# Patient Record
Sex: FEMALE | Race: WHITE | Employment: PART TIME | URBAN - METROPOLITAN AREA
[De-identification: names, ages, dates, MRNs, and addresses within clinical notes are randomized per-mention and may not be internally consistent; named-entity substitution may affect disease eponyms.]

---
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
## 2020-11-17 ENCOUNTER — HOSPITAL ENCOUNTER (OUTPATIENT)
Dept: ULTRASOUND IMAGING | Age: 20
Discharge: HOME | End: 2020-11-17
Payer: COMMERCIAL

## 2020-11-17 PROCEDURE — 76642 ULTRASOUND BREAST LIMITED: CPT

## 2021-05-08 ENCOUNTER — HOSPITAL ENCOUNTER (EMERGENCY)
Age: 21
Discharge: HOME | End: 2021-05-08
Attending: EMERGENCY MEDICINE
Payer: COMMERCIAL

## 2021-05-08 VITALS
SYSTOLIC BLOOD PRESSURE: 125 MMHG | WEIGHT: 212 LBS | TEMPERATURE: 97.7 F | RESPIRATION RATE: 16 BRPM | HEART RATE: 61 BPM | OXYGEN SATURATION: 97 % | BODY MASS INDEX: 36.19 KG/M2 | DIASTOLIC BLOOD PRESSURE: 69 MMHG | HEIGHT: 64 IN

## 2021-05-08 DIAGNOSIS — J06.9 UPPER RESPIRATORY TRACT INFECTION, UNSPECIFIED TYPE: Primary | ICD-10-CM

## 2021-05-08 LAB
BILIRUBIN URINE: NEGATIVE
BLOOD, URINE: NEGATIVE
CLARITY: CLEAR
COLOR: YELLOW
GLUCOSE URINE: NEGATIVE MG/DL
HCG(URINE) PREGNANCY TEST: NEGATIVE
KETONES, URINE: NEGATIVE MG/DL
LEUKOCYTE ESTERASE, URINE: NEGATIVE
NITRITE, URINE: NEGATIVE
PH UA: >=9 (ref 5–9)
PROTEIN UA: NEGATIVE MG/DL
SPECIFIC GRAVITY UA: 1.02 (ref 1–1.03)
STREP GRP A PCR: NEGATIVE
UROBILINOGEN, URINE: 0.2 E.U./DL

## 2021-05-08 PROCEDURE — 81025 URINE PREGNANCY TEST: CPT

## 2021-05-08 PROCEDURE — 99282 EMERGENCY DEPT VISIT SF MDM: CPT

## 2021-05-08 PROCEDURE — 87880 STREP A ASSAY W/OPTIC: CPT

## 2021-05-08 PROCEDURE — 81003 URINALYSIS AUTO W/O SCOPE: CPT

## 2021-05-08 RX ORDER — DOXYCYCLINE HYCLATE 100 MG
100 TABLET ORAL 2 TIMES DAILY
Qty: 20 TABLET | Refills: 0 | Status: SHIPPED | OUTPATIENT
Start: 2021-05-08 | End: 2021-05-18

## 2021-05-08 RX ORDER — BROMPHENIRAMINE MALEATE, PSEUDOEPHEDRINE HYDROCHLORIDE, AND DEXTROMETHORPHAN HYDROBROMIDE 2; 30; 10 MG/5ML; MG/5ML; MG/5ML
5 SYRUP ORAL 4 TIMES DAILY PRN
Qty: 120 ML | Refills: 0 | Status: SHIPPED | OUTPATIENT
Start: 2021-05-08 | End: 2021-06-06

## 2021-05-08 ASSESSMENT — ENCOUNTER SYMPTOMS
ABDOMINAL DISTENTION: 0
COUGH: 0
EYE DISCHARGE: 0
VOMITING: 0
EYE PAIN: 0
DIARRHEA: 0
NAUSEA: 0
EYE REDNESS: 0
BACK PAIN: 0
SINUS PRESSURE: 0
SORE THROAT: 1
SHORTNESS OF BREATH: 0
WHEEZING: 0

## 2021-05-08 NOTE — LETTER
[address] [place] [address] Emergency Department  [facility] [address] [place] [address]  Phone: [phone]               May 8, 2021    Patient: Mandy Lopez   YOB: 2000   Date of Visit: 5/8/2021       To Whom It May Concern: Nasreen Wright was seen and treated in our emergency department on 5/8/2021. She may return to work on 5/9/21.       Sincerely,       Alda Sandoval RN         Signature:__________________________________

## 2021-05-08 NOTE — ED PROVIDER NOTES
Patient had Covid test last week which was negative    The history is provided by the patient. Illness   The current episode started more than 2 weeks ago. The onset was gradual. Associated symptoms include congestion, sore throat and muscle aches. Pertinent negatives include no fever, no diarrhea, no nausea, no vomiting, no ear pain, no headaches, no cough, no wheezing, no rash, no eye discharge, no eye pain and no eye redness. Review of Systems   Constitutional: Negative for chills and fever. HENT: Positive for congestion and sore throat. Negative for ear pain and sinus pressure. Eyes: Negative for pain, discharge and redness. Respiratory: Negative for cough, shortness of breath and wheezing. Cardiovascular: Negative for chest pain. Gastrointestinal: Negative for abdominal distention, diarrhea, nausea and vomiting. Genitourinary: Negative for dysuria and frequency. Musculoskeletal: Negative for arthralgias and back pain. Skin: Negative for rash and wound. Neurological: Negative for weakness and headaches. Hematological: Negative for adenopathy. All other systems reviewed and are negative. Physical Exam  Vitals signs and nursing note reviewed. Constitutional:       Appearance: She is well-developed. HENT:      Head: Normocephalic and atraumatic. Right Ear: Hearing, tympanic membrane and external ear normal.      Left Ear: Hearing, tympanic membrane and external ear normal.      Nose: Mucosal edema and congestion present. Mouth/Throat:      Pharynx: Uvula midline. Eyes:      General: Lids are normal.      Conjunctiva/sclera: Conjunctivae normal.      Pupils: Pupils are equal, round, and reactive to light. Neck:      Musculoskeletal: Normal range of motion and neck supple. Cardiovascular:      Rate and Rhythm: Normal rate and regular rhythm. Heart sounds: Normal heart sounds. No murmur.    Pulmonary:      Effort: Pulmonary effort is normal. No respiratory distress. Breath sounds: Normal breath sounds. No wheezing or rales. Abdominal:      General: Bowel sounds are normal.      Palpations: Abdomen is soft. Abdomen is not rigid. Tenderness: There is no abdominal tenderness. There is no guarding or rebound. Skin:     General: Skin is warm and dry. Findings: No abrasion or rash. Neurological:      Mental Status: She is alert and oriented to person, place, and time. GCS: GCS eye subscore is 4. GCS verbal subscore is 5. GCS motor subscore is 6. Cranial Nerves: No cranial nerve deficit. Sensory: No sensory deficit. Coordination: Coordination normal.      Gait: Gait normal.          Procedures     MDM          --------------------------------------------- PAST HISTORY ---------------------------------------------  Past Medical History:  has a past medical history of ADHD, Bipolar 1 disorder ([facility] [address] Depression, and Insomnia. Past Surgical History:  has no past surgical history on file. Social History:  reports that she has been smoking cigarettes. She has been smoking about 0.50 packs per day. She has never used smokeless tobacco. She reports current drug use. Drug: Marijuana. She reports that she does not drink alcohol. Family History: family history is not on file. The patients home medications have been reviewed.     Allergies: Sulfa antibiotics and Amoxicillin    -------------------------------------------------- RESULTS -------------------------------------------------  Labs:  Results for orders placed or performed during the hospital encounter of 05/08/21   Strep screen group a throat    Specimen: Throat   Result Value Ref Range    Strep Grp A PCR Negative Negative   Urinalysis   Result Value Ref Range    Color, UA Yellow Straw/Yellow    Clarity, UA Clear Clear    Glucose, Ur Negative Negative mg/dL    Bilirubin Urine Negative Negative    Ketones, Urine Negative Negative mg/dL    Specific Gravity, UA 1.020 1.005 - 1.030    Blood, Urine Negative Negative    pH, UA >=9.0 5.0 - 9.0    Protein, UA Negative Negative mg/dL    Urobilinogen, Urine 0.2 <2.0 E.U./dL    Nitrite, Urine Negative Negative    Leukocyte Esterase, Urine Negative Negative   Pregnancy, Urine   Result Value Ref Range    HCG(Urine) Pregnancy Test NEGATIVE NEGATIVE       Radiology:  No orders to display       ------------------------- NURSING NOTES AND VITALS REVIEWED ---------------------------  Date / Time Roomed:  5/8/2021 12:47 PM  ED Bed Assignment:  02/02    The nursing notes within the ED encounter and vital signs as below have been reviewed. /69   Pulse 61   Temp 97.7 °F (36.5 °C) (Temporal)   Resp 16   Ht 5' 4\" (1.626 m)   Wt 212 lb (96.2 kg)   SpO2 97%   BMI 36.39 kg/m²   Oxygen Saturation Interpretation: Normal      ------------------------------------------ PROGRESS NOTES ------------------------------------------  I have spoken with the patient and discussed todays results, in addition to providing specific details for the plan of care and counseling regarding the diagnosis and prognosis. Their questions are answered at this time and they are agreeable with the plan. I discussed at length with them reasons for immediate return here for re evaluation. They will followup with primary care by calling their office tomorrow. --------------------------------- ADDITIONAL PROVIDER NOTES ---------------------------------  At this time the patient is without objective evidence of an acute process requiring hospitalization or inpatient management. They have remained hemodynamically stable throughout their entire ED visit and are stable for discharge with outpatient follow-up. The plan has been discussed in detail and they are aware of the specific conditions for emergent return, as well as the importance of follow-up.       New Prescriptions    BROMPHENIRAMINE-PSEUDOEPHEDRINE-DM 2-30-10 MG/5ML SYRUP    Take 5 mLs by mouth 4

## 2021-06-06 ENCOUNTER — HOSPITAL ENCOUNTER (EMERGENCY)
Age: 21
Discharge: HOME | End: 2021-06-06
Attending: EMERGENCY MEDICINE
Payer: COMMERCIAL

## 2021-06-06 VITALS
OXYGEN SATURATION: 96 % | HEART RATE: 85 BPM | SYSTOLIC BLOOD PRESSURE: 127 MMHG | WEIGHT: 212 LBS | DIASTOLIC BLOOD PRESSURE: 81 MMHG | BODY MASS INDEX: 36.39 KG/M2 | TEMPERATURE: 97.7 F | RESPIRATION RATE: 16 BRPM

## 2021-06-06 DIAGNOSIS — H61.23 BILATERAL IMPACTED CERUMEN: Primary | ICD-10-CM

## 2021-06-06 PROCEDURE — 99283 EMERGENCY DEPT VISIT LOW MDM: CPT

## 2021-06-06 PROCEDURE — 69209 REMOVE IMPACTED EAR WAX UNI: CPT

## 2021-06-06 ASSESSMENT — PAIN DESCRIPTION - ORIENTATION: ORIENTATION: LEFT

## 2021-06-06 ASSESSMENT — PAIN DESCRIPTION - FREQUENCY: FREQUENCY: INTERMITTENT

## 2021-06-06 ASSESSMENT — PAIN DESCRIPTION - PAIN TYPE: TYPE: ACUTE PAIN

## 2021-06-06 ASSESSMENT — PAIN DESCRIPTION - LOCATION: LOCATION: EAR

## 2021-06-06 ASSESSMENT — PAIN SCALES - GENERAL: PAINLEVEL_OUTOF10: 5

## 2021-06-06 ASSESSMENT — PAIN DESCRIPTION - DESCRIPTORS: DESCRIPTORS: SHARP

## 2021-06-06 NOTE — ED PROVIDER NOTES
HPI:  6/6/21,   Time: 4:23 PM EDT         Babs Hoffman is a 21 y.o. female presenting to the ED for sudden onset of left ear pain and decreased hearing, beginning 1 day ago. The complaint has been persistent, moderate in severity, and worsened by nothing. ROS:   Pertinent positives and negatives are stated within HPI, all other systems reviewed and are negative.  --------------------------------------------- PAST HISTORY ---------------------------------------------  Past Medical History:  has a past medical history of ADHD, Bipolar 1 disorder ([facility] [address] Depression, and Insomnia. Past Surgical History:  has no past surgical history on file. Social History:  reports that she has been smoking cigarettes. She has been smoking about 0.50 packs per day. She has never used smokeless tobacco. She reports current drug use. Drug: Marijuana. She reports that she does not drink alcohol. Family History: family history is not on file. The patients home medications have been reviewed. Allergies: Sulfa antibiotics and Amoxicillin    -------------------------------------------------- RESULTS -------------------------------------------------  All laboratory and radiology results have been personally reviewed by myself   LABS:  No results found for this visit on 06/06/21. RADIOLOGY:  Interpreted by Radiologist.  No orders to display       ------------------------- NURSING NOTES AND VITALS REVIEWED ---------------------------   The nursing notes within the ED encounter and vital signs as below have been reviewed.    /81   Pulse 85   Temp 97.7 °F (36.5 °C) (Temporal)   Resp 16   Wt 212 lb (96.2 kg)   SpO2 96%   BMI 36.39 kg/m²   Oxygen Saturation Interpretation: Normal      ---------------------------------------------------PHYSICAL EXAM--------------------------------------      Constitutional/General: Alert and oriented x3, well appearing, non toxic in NAD  Head: NC/AT  Eyes: PERRL, EOMI  Ears:

## 2021-08-13 ENCOUNTER — HOSPITAL ENCOUNTER (EMERGENCY)
Age: 21
Discharge: HOME | End: 2021-08-13
Attending: EMERGENCY MEDICINE
Payer: COMMERCIAL

## 2021-08-13 VITALS
WEIGHT: 212 LBS | TEMPERATURE: 97.5 F | RESPIRATION RATE: 16 BRPM | HEART RATE: 80 BPM | BODY MASS INDEX: 36.39 KG/M2 | DIASTOLIC BLOOD PRESSURE: 70 MMHG | OXYGEN SATURATION: 97 % | SYSTOLIC BLOOD PRESSURE: 124 MMHG

## 2021-08-13 DIAGNOSIS — J01.90 ACUTE SINUSITIS, RECURRENCE NOT SPECIFIED, UNSPECIFIED LOCATION: Primary | ICD-10-CM

## 2021-08-13 PROCEDURE — 99283 EMERGENCY DEPT VISIT LOW MDM: CPT

## 2021-08-13 RX ORDER — BROMPHENIRAMINE MALEATE, PSEUDOEPHEDRINE HYDROCHLORIDE, AND DEXTROMETHORPHAN HYDROBROMIDE 2; 30; 10 MG/5ML; MG/5ML; MG/5ML
5 SYRUP ORAL 4 TIMES DAILY PRN
Qty: 120 ML | Refills: 0 | Status: SHIPPED | OUTPATIENT
Start: 2021-08-13 | End: 2021-09-17 | Stop reason: ALTCHOICE

## 2021-08-13 RX ORDER — DOXYCYCLINE HYCLATE 100 MG
100 TABLET ORAL 2 TIMES DAILY
Qty: 20 TABLET | Refills: 0 | Status: SHIPPED | OUTPATIENT
Start: 2021-08-13 | End: 2021-08-23

## 2021-08-13 ASSESSMENT — ENCOUNTER SYMPTOMS
SHORTNESS OF BREATH: 0
EYE PAIN: 0
BACK PAIN: 0
COUGH: 1
SINUS PRESSURE: 0
EYE DISCHARGE: 0
WHEEZING: 0
RHINORRHEA: 1
EYE REDNESS: 0
DIARRHEA: 0
NAUSEA: 0
SORE THROAT: 0
ABDOMINAL DISTENTION: 0
VOMITING: 0

## 2021-08-13 ASSESSMENT — PAIN DESCRIPTION - LOCATION: LOCATION: HEAD

## 2021-08-13 ASSESSMENT — PAIN DESCRIPTION - PAIN TYPE: TYPE: ACUTE PAIN

## 2021-08-13 ASSESSMENT — PAIN DESCRIPTION - DESCRIPTORS: DESCRIPTORS: ACHING

## 2021-08-13 ASSESSMENT — PAIN DESCRIPTION - PROGRESSION
CLINICAL_PROGRESSION: NOT CHANGED
CLINICAL_PROGRESSION: NOT CHANGED

## 2021-08-13 ASSESSMENT — PAIN DESCRIPTION - FREQUENCY: FREQUENCY: CONTINUOUS

## 2021-08-13 ASSESSMENT — PAIN SCALES - GENERAL: PAINLEVEL_OUTOF10: 5

## 2021-08-13 NOTE — ED PROVIDER NOTES
The history is provided by the patient. URI  Presenting symptoms: congestion, cough, ear pain, facial pain and rhinorrhea    Presenting symptoms: no fatigue, no fever and no sore throat    Severity:  Moderate  Onset quality:  Gradual  Duration:  1 week  Chronicity:  New  Associated symptoms: no arthralgias, no headaches and no wheezing         Review of Systems   Constitutional: Negative for chills, fatigue and fever. HENT: Positive for congestion, ear pain and rhinorrhea. Negative for sinus pressure and sore throat. Eyes: Negative for pain, discharge and redness. Respiratory: Positive for cough. Negative for shortness of breath and wheezing. Cardiovascular: Negative for chest pain. Gastrointestinal: Negative for abdominal distention, diarrhea, nausea and vomiting. Genitourinary: Negative for dysuria and frequency. Musculoskeletal: Negative for arthralgias and back pain. Skin: Negative for rash and wound. Neurological: Negative for weakness and headaches. Hematological: Negative for adenopathy. All other systems reviewed and are negative. Physical Exam  Vitals and nursing note reviewed. Constitutional:       Appearance: She is well-developed. HENT:      Head: Normocephalic and atraumatic. Right Ear: Hearing and external ear normal. Tympanic membrane is retracted. Left Ear: Hearing and external ear normal. Tympanic membrane is retracted. Nose: Mucosal edema, congestion and rhinorrhea present. Mouth/Throat:      Pharynx: Uvula midline. Eyes:      General: Lids are normal.      Conjunctiva/sclera: Conjunctivae normal.      Pupils: Pupils are equal, round, and reactive to light. Cardiovascular:      Rate and Rhythm: Normal rate and regular rhythm. Heart sounds: Normal heart sounds. No murmur heard. Pulmonary:      Effort: Pulmonary effort is normal. No respiratory distress. Breath sounds: Normal breath sounds. No wheezing or rales.    Abdominal: General: Bowel sounds are normal.      Palpations: Abdomen is soft. Abdomen is not rigid. Tenderness: There is no abdominal tenderness. There is no guarding or rebound. Musculoskeletal:      Cervical back: Normal range of motion and neck supple. Skin:     General: Skin is warm and dry. Findings: No abrasion or rash. Neurological:      Mental Status: She is alert and oriented to person, place, and time. GCS: GCS eye subscore is 4. GCS verbal subscore is 5. GCS motor subscore is 6. Cranial Nerves: No cranial nerve deficit. Sensory: No sensory deficit. Coordination: Coordination normal.      Gait: Gait normal.          Procedures     MDM          --------------------------------------------- PAST HISTORY ---------------------------------------------  Past Medical History:  has a past medical history of ADHD, Bipolar 1 disorder ([facility] [address] Depression, and Insomnia. Past Surgical History:  has no past surgical history on file. Social History:  reports that she has been smoking cigarettes. She has been smoking about 0.50 packs per day. She has never used smokeless tobacco. She reports current drug use. Drug: Marijuana. She reports that she does not drink alcohol. Family History: family history is not on file. The patients home medications have been reviewed. Allergies: Sulfa antibiotics and Amoxicillin    -------------------------------------------------- RESULTS -------------------------------------------------  Labs:  No results found for this visit on 08/13/21. Radiology:  No orders to display       ------------------------- NURSING NOTES AND VITALS REVIEWED ---------------------------  Date / Time Roomed:  8/13/2021  9:16 AM  ED Bed Assignment:  01/01    The nursing notes within the ED encounter and vital signs as below have been reviewed.    /70   Pulse 80   Temp 97.5 °F (36.4 °C) (Temporal)   Resp 16   Wt 212 lb (96.2 kg)   SpO2 97%   BMI 36.39 kg/m² Oxygen Saturation Interpretation: Normal      ------------------------------------------ PROGRESS NOTES ------------------------------------------  I have spoken with the patient and discussed todays results, in addition to providing specific details for the plan of care and counseling regarding the diagnosis and prognosis. Their questions are answered at this time and they are agreeable with the plan. I discussed at length with them reasons for immediate return here for re evaluation. They will followup with primary care by calling their office tomorrow. --------------------------------- ADDITIONAL PROVIDER NOTES ---------------------------------  At this time the patient is without objective evidence of an acute process requiring hospitalization or inpatient management. They have remained hemodynamically stable throughout their entire ED visit and are stable for discharge with outpatient follow-up. The plan has been discussed in detail and they are aware of the specific conditions for emergent return, as well as the importance of follow-up. New Prescriptions    BROMPHENIRAMINE-PSEUDOEPHEDRINE-DM 2-30-10 MG/5ML SYRUP    Take 5 mLs by mouth 4 times daily as needed for Congestion or Cough    DOXYCYCLINE HYCLATE (VIBRA-TABS) 100 MG TABLET    Take 1 tablet by mouth 2 times daily for 10 days       Diagnosis:  1. Acute sinusitis, recurrence not specified, unspecified location        Disposition:  Patient's disposition: Discharge to home  Patient's condition is stable.                      Marissa Guillory MD  08/13/21 [id number]

## 2021-08-13 NOTE — LETTER
[address] [place] [address] [facility] Department  [facility] [address] [place] [address]  Phone: [phone]               August 13, 2021    Patient: Charan Sandoval   YOB: 2000   Date of Visit: 8/13/2021       To Whom It May Concern: Svetlana Florian was seen and treated in our emergency department on 8/13/2021. She may return to work on 8/14/2021.       Sincerely,       Daniel Fraire RN         Signature:__________________________________

## 2021-09-17 ENCOUNTER — HOSPITAL ENCOUNTER (EMERGENCY)
Age: 21
Discharge: HOME | End: 2021-09-17
Attending: EMERGENCY MEDICINE
Payer: COMMERCIAL

## 2021-09-17 VITALS
DIASTOLIC BLOOD PRESSURE: 86 MMHG | HEART RATE: 93 BPM | RESPIRATION RATE: 13 BRPM | TEMPERATURE: 97.7 F | SYSTOLIC BLOOD PRESSURE: 138 MMHG | OXYGEN SATURATION: 98 % | BODY MASS INDEX: 39.99 KG/M2 | WEIGHT: 233 LBS

## 2021-09-17 DIAGNOSIS — J06.9 VIRAL URI: Primary | ICD-10-CM

## 2021-09-17 LAB — SARS-COV-2, NAAT: NOT DETECTED

## 2021-09-17 PROCEDURE — 87635 SARS-COV-2 COVID-19 AMP PRB: CPT

## 2021-09-17 PROCEDURE — 99283 EMERGENCY DEPT VISIT LOW MDM: CPT

## 2021-09-17 RX ORDER — BROMPHENIRAMINE MALEATE, PSEUDOEPHEDRINE HYDROCHLORIDE, AND DEXTROMETHORPHAN HYDROBROMIDE 2; 30; 10 MG/5ML; MG/5ML; MG/5ML
5 SYRUP ORAL 4 TIMES DAILY PRN
Qty: 120 ML | Refills: 0 | Status: SHIPPED | OUTPATIENT
Start: 2021-09-17 | End: 2021-12-29 | Stop reason: ALTCHOICE

## 2021-09-17 ASSESSMENT — ENCOUNTER SYMPTOMS
NAUSEA: 0
BACK PAIN: 0
RHINORRHEA: 1
EYE REDNESS: 0
SORE THROAT: 0
SINUS PRESSURE: 0
VOMITING: 0
ABDOMINAL DISTENTION: 0
EYE DISCHARGE: 0
DIARRHEA: 0
SHORTNESS OF BREATH: 0
EYE PAIN: 0
COUGH: 1
WHEEZING: 0

## 2021-09-17 NOTE — ED PROVIDER NOTES
Vaccinated for Covid    Denies loss of smell or taste    The history is provided by the patient. URI  Presenting symptoms: congestion, cough and rhinorrhea    Presenting symptoms: no ear pain, no fever and no sore throat    Severity:  Mild  Onset quality:  Gradual  Duration:  1 day  Chronicity:  New  Associated symptoms: no arthralgias, no headaches and no wheezing         Review of Systems   Constitutional: Negative for chills and fever. HENT: Positive for congestion and rhinorrhea. Negative for ear pain, sinus pressure and sore throat. Eyes: Negative for pain, discharge and redness. Respiratory: Positive for cough. Negative for shortness of breath and wheezing. Cardiovascular: Negative for chest pain. Gastrointestinal: Negative for abdominal distention, diarrhea, nausea and vomiting. Genitourinary: Negative for dysuria and frequency. Musculoskeletal: Negative for arthralgias and back pain. Skin: Negative for rash and wound. Neurological: Negative for weakness and headaches. Hematological: Negative for adenopathy. All other systems reviewed and are negative. Physical Exam  Vitals and nursing note reviewed. Constitutional:       Appearance: She is well-developed. HENT:      Head: Normocephalic and atraumatic. Right Ear: Tympanic membrane is retracted. Left Ear: Tympanic membrane is retracted. Nose: Mucosal edema and congestion present. Eyes:      Pupils: Pupils are equal, round, and reactive to light. Cardiovascular:      Rate and Rhythm: Normal rate and regular rhythm. Heart sounds: Normal heart sounds. No murmur heard. Pulmonary:      Effort: Pulmonary effort is normal. No respiratory distress. Breath sounds: Normal breath sounds. No wheezing or rales. Abdominal:      General: Bowel sounds are normal.      Palpations: Abdomen is soft. Tenderness: There is no abdominal tenderness. There is no guarding or rebound.    Musculoskeletal: Cervical back: Normal range of motion and neck supple. Skin:     General: Skin is warm and dry. Neurological:      Mental Status: She is alert and oriented to person, place, and time. Cranial Nerves: No cranial nerve deficit. Coordination: Coordination normal.          Procedures     [facility]          --------------------------------------------- PAST HISTORY ---------------------------------------------  Past Medical History:  has a past medical history of ADHD, Bipolar 1 disorder ([address] Depression, and Insomnia. Past Surgical History:  has no past surgical history on file. Social History:  reports that she has been smoking cigarettes. She has been smoking about 0.50 packs per day. She has never used smokeless tobacco. She reports current drug use. Drug: Marijuana. She reports that she does not drink alcohol. Family History: family history is not on file. The patients home medications have been reviewed. Allergies: Sulfa antibiotics and Amoxicillin    -------------------------------------------------- RESULTS -------------------------------------------------  Labs:  Results for orders placed or performed during the hospital encounter of 09/17/21   COVID-19, Rapid    Specimen: Nasopharyngeal Swab   Result Value Ref Range    SARS-CoV-2, NAAT Not Detected Not Detected       Radiology:  No orders to display       ------------------------- NURSING NOTES AND VITALS REVIEWED ---------------------------  Date / Time Roomed:  9/17/2021  9:07 AM  ED Bed Assignment:  02/02    The nursing notes within the ED encounter and vital signs as below have been reviewed.    /86   Pulse 93   Temp 97.7 °F (36.5 °C) (Temporal)   Resp 13   Wt 233 lb (105.7 kg)   SpO2 98%   BMI 39.99 kg/m²   Oxygen Saturation Interpretation: Normal      ------------------------------------------ PROGRESS NOTES ------------------------------------------  I have spoken with the patient and discussed todays results, in addition to providing specific details for the plan of care and counseling regarding the diagnosis and prognosis. Their questions are answered at this time and they are agreeable with the plan. I discussed at length with them reasons for immediate return here for re evaluation. They will followup with primary care by calling their office tomorrow. --------------------------------- ADDITIONAL PROVIDER NOTES ---------------------------------  At this time the patient is without objective evidence of an acute process requiring hospitalization or inpatient management. They have remained hemodynamically stable throughout their entire ED visit and are stable for discharge with outpatient follow-up. The plan has been discussed in detail and they are aware of the specific conditions for emergent return, as well as the importance of follow-up. New Prescriptions    BROMPHENIRAMINE-PSEUDOEPHEDRINE-DM 2-30-10 MG/5ML SYRUP    Take 5 mLs by mouth 4 times daily as needed for Congestion or Cough       Diagnosis:  1. Viral URI        Disposition:  Patient's disposition: Discharge to home  Patient's condition is stable.                    Tash Edwards MD  09/17/21 1007

## 2021-09-17 NOTE — LETTER
[address] [place] [address] Emergency Department  [address] [address]  Phone: [phone]               September 17, 2021    Patient: Daniel Stephenson   YOB: 2000   Date of Visit: 9/17/2021       To Whom It May Concern: Florencia Fox was seen and treated in our emergency department on 9/17/2021. She may return to work on 9/18/2021.       Sincerely,       Jolanda Kanner, RN         Signature:__________________________________

## 2021-12-29 ENCOUNTER — HOSPITAL ENCOUNTER (EMERGENCY)
Age: 21
Discharge: HOME | End: 2021-12-29
Attending: EMERGENCY MEDICINE
Payer: COMMERCIAL

## 2021-12-29 VITALS
BODY MASS INDEX: 39.99 KG/M2 | TEMPERATURE: 97.5 F | DIASTOLIC BLOOD PRESSURE: 59 MMHG | HEART RATE: 84 BPM | WEIGHT: 233 LBS | OXYGEN SATURATION: 95 % | SYSTOLIC BLOOD PRESSURE: 118 MMHG | RESPIRATION RATE: 18 BRPM

## 2021-12-29 DIAGNOSIS — Z20.822 SUSPECTED COVID-19 VIRUS INFECTION: Primary | ICD-10-CM

## 2021-12-29 PROCEDURE — U0005 INFEC AGEN DETEC AMPLI PROBE: HCPCS

## 2021-12-29 PROCEDURE — U0003 INFECTIOUS AGENT DETECTION BY NUCLEIC ACID (DNA OR RNA); SEVERE ACUTE RESPIRATORY SYNDROME CORONAVIRUS 2 (SARS-COV-2) (CORONAVIRUS DISEASE [COVID-19]), AMPLIFIED PROBE TECHNIQUE, MAKING USE OF HIGH THROUGHPUT TECHNOLOGIES AS DESCRIBED BY CMS-2020-01-R: HCPCS

## 2021-12-29 PROCEDURE — 99283 EMERGENCY DEPT VISIT LOW MDM: CPT

## 2021-12-29 RX ORDER — BROMPHENIRAMINE MALEATE, PSEUDOEPHEDRINE HYDROCHLORIDE, AND DEXTROMETHORPHAN HYDROBROMIDE 2; 30; 10 MG/5ML; MG/5ML; MG/5ML
5 SYRUP ORAL 4 TIMES DAILY PRN
Qty: 120 ML | Refills: 0 | Status: SHIPPED | OUTPATIENT
Start: 2021-12-29 | End: 2022-01-16

## 2021-12-29 ASSESSMENT — ENCOUNTER SYMPTOMS
SORE THROAT: 0
EYE DISCHARGE: 0
DIARRHEA: 0
SINUS PRESSURE: 0
EYE REDNESS: 0
WHEEZING: 0
NAUSEA: 0
SHORTNESS OF BREATH: 0
COUGH: 1
EYE PAIN: 0
RHINORRHEA: 1
ABDOMINAL DISTENTION: 0
VOMITING: 0
BACK PAIN: 0

## 2021-12-29 NOTE — Clinical Note
Payam Villegas was seen and treated in our emergency department on 12/29/2021. COVID19 virus is suspected. Per the CDC guidelines we recommend home isolation until the following conditions are all met:    1. At least 10 days have passed since symptoms first appeared and  2. At least 24 hours have passed since last fever without the use of fever-reducing medications and  3. Symptoms (e.g., cough, shortness of breath) have improved    If you have any questions or concerns, please don't hesitate to call.     She may return to work/school on 01/05/2022    Covid test results are pending    Zuri Ramírez MD

## 2021-12-29 NOTE — ED PROVIDER NOTES
MULTIPLE EXPOSURE TO COVID IN THERE FAMILY    The history is provided by the patient. URI  Presenting symptoms: congestion, cough and rhinorrhea    Presenting symptoms: no ear pain, no facial pain, no fatigue, no fever and no sore throat    Severity:  Mild  Onset quality:  Gradual  Duration:  1 week  Chronicity:  New  Associated symptoms: no arthralgias, no headaches and no wheezing         Review of Systems   Constitutional: Negative for chills, fatigue and fever. HENT: Positive for congestion and rhinorrhea. Negative for ear pain, sinus pressure and sore throat. Eyes: Negative for pain, discharge and redness. Respiratory: Positive for cough. Negative for shortness of breath and wheezing. Cardiovascular: Negative for chest pain. Gastrointestinal: Negative for abdominal distention, diarrhea, nausea and vomiting. Genitourinary: Negative for dysuria and frequency. Musculoskeletal: Negative for arthralgias and back pain. Skin: Negative for rash and wound. Neurological: Negative for weakness and headaches. Hematological: Negative for adenopathy. All other systems reviewed and are negative. Physical Exam  Vitals and nursing note reviewed. Constitutional:       Appearance: She is well-developed. HENT:      Head: Normocephalic and atraumatic. Right Ear: Hearing and external ear normal. Tympanic membrane is retracted. Left Ear: Hearing and external ear normal. Tympanic membrane is retracted. Nose: Mucosal edema and congestion present. Mouth/Throat:      Pharynx: Uvula midline. Eyes:      General: Lids are normal.      Conjunctiva/sclera: Conjunctivae normal.      Pupils: Pupils are equal, round, and reactive to light. Cardiovascular:      Rate and Rhythm: Normal rate and regular rhythm. Heart sounds: Normal heart sounds. No murmur heard. Pulmonary:      Effort: Pulmonary effort is normal. No respiratory distress.       Breath sounds: Normal breath sounds. No wheezing or rales. Abdominal:      General: Bowel sounds are normal.      Palpations: Abdomen is soft. Abdomen is not rigid. Tenderness: There is no abdominal tenderness. There is no guarding or rebound. Musculoskeletal:      Cervical back: Normal range of motion and neck supple. Skin:     General: Skin is warm and dry. Findings: No abrasion or rash. Neurological:      Mental Status: She is alert and oriented to person, place, and time. GCS: GCS eye subscore is 4. GCS verbal subscore is 5. GCS motor subscore is 6. Cranial Nerves: No cranial nerve deficit. Sensory: No sensory deficit. Coordination: Coordination normal.      Gait: Gait normal.          Procedures     MDM          --------------------------------------------- PAST HISTORY ---------------------------------------------  Past Medical History:  has a past medical history of ADHD, Bipolar 1 disorder ([facility] [address] Depression, and Insomnia. Past Surgical History:  has no past surgical history on file. Social History:  reports that she has been smoking cigarettes. She has been smoking about 0.50 packs per day. She has never used smokeless tobacco. She reports current drug use. Drug: Marijuana Guillermina Beck). She reports that she does not drink alcohol. Family History: family history is not on file. The patients home medications have been reviewed. Allergies: Sulfa antibiotics, Amoxicillin, and Nickel    -------------------------------------------------- RESULTS -------------------------------------------------  Labs:  No results found for this visit on 12/29/21. Radiology:  No orders to display       ------------------------- NURSING NOTES AND VITALS REVIEWED ---------------------------  Date / Time Roomed:  12/29/2021  9:10 AM  ED Bed Assignment:  04/04    The nursing notes within the ED encounter and vital signs as below have been reviewed.    BP (!) 118/59   Pulse 84   Temp 97.5 °F (36.4 °C) (Temporal) Resp 18   Wt 233 lb (105.7 kg)   SpO2 95%   BMI 39.99 kg/m²   Oxygen Saturation Interpretation: Normal      ------------------------------------------ PROGRESS NOTES ------------------------------------------  I have spoken with the patient and discussed todays results, in addition to providing specific details for the plan of care and counseling regarding the diagnosis and prognosis. Their questions are answered at this time and they are agreeable with the plan. I discussed at length with them reasons for immediate return here for re evaluation. They will followup with primary care by calling their office tomorrow. PCR COVID TEST DONE as a send out      --------------------------------- ADDITIONAL PROVIDER NOTES ---------------------------------  At this time the patient is without objective evidence of an acute process requiring hospitalization or inpatient management. They have remained hemodynamically stable throughout their entire ED visit and are stable for discharge with outpatient follow-up. The plan has been discussed in detail and they are aware of the specific conditions for emergent return, as well as the importance of follow-up. New Prescriptions    BROMPHENIRAMINE-PSEUDOEPHEDRINE-DM 2-30-10 MG/5ML SYRUP    Take 5 mLs by mouth 4 times daily as needed for Congestion or Cough       Diagnosis:  1. Suspected COVID-19 virus infection        Disposition:  Patient's disposition: Discharge to home  Patient's condition is stable.                       Toñito Rocha MD  12/29/21 [id number]

## 2021-12-31 LAB — SARS-COV-2, PCR: DETECTED

## 2022-01-16 ENCOUNTER — HOSPITAL ENCOUNTER (EMERGENCY)
Age: 22
Discharge: HOME | End: 2022-01-16
Attending: EMERGENCY MEDICINE
Payer: COMMERCIAL

## 2022-01-16 VITALS
DIASTOLIC BLOOD PRESSURE: 86 MMHG | TEMPERATURE: 97 F | OXYGEN SATURATION: 100 % | HEART RATE: 106 BPM | SYSTOLIC BLOOD PRESSURE: 138 MMHG | RESPIRATION RATE: 16 BRPM | HEIGHT: 64 IN | WEIGHT: 230 LBS | BODY MASS INDEX: 39.27 KG/M2

## 2022-01-16 DIAGNOSIS — H61.22 IMPACTED CERUMEN OF LEFT EAR: Primary | ICD-10-CM

## 2022-01-16 PROCEDURE — 99283 EMERGENCY DEPT VISIT LOW MDM: CPT

## 2022-01-16 RX ORDER — NEOMYCIN SULFATE, POLYMYXIN B SULFATE, HYDROCORTISONE 3.5; 10000; 1 MG/ML; [USP'U]/ML; MG/ML
2 SOLUTION/ DROPS AURICULAR (OTIC) EVERY 8 HOURS SCHEDULED
Qty: 10 ML | Refills: 0 | Status: SHIPPED | OUTPATIENT
Start: 2022-01-16 | End: 2022-01-21

## 2022-01-16 ASSESSMENT — ENCOUNTER SYMPTOMS
EYE PAIN: 0
EYE DISCHARGE: 0
SORE THROAT: 0
WHEEZING: 0
COUGH: 0
SINUS PRESSURE: 0
VOMITING: 0
NAUSEA: 0
DIARRHEA: 0
EYE REDNESS: 0
ABDOMINAL DISTENTION: 0
SHORTNESS OF BREATH: 0
BACK PAIN: 0

## 2022-01-16 ASSESSMENT — PAIN DESCRIPTION - ONSET: ONSET: ON-GOING

## 2022-01-16 ASSESSMENT — PAIN DESCRIPTION - ORIENTATION: ORIENTATION: LEFT

## 2022-01-16 ASSESSMENT — PAIN DESCRIPTION - PAIN TYPE: TYPE: ACUTE PAIN

## 2022-01-16 ASSESSMENT — PAIN DESCRIPTION - LOCATION: LOCATION: EAR

## 2022-01-16 ASSESSMENT — PAIN DESCRIPTION - FREQUENCY: FREQUENCY: CONTINUOUS

## 2022-01-16 ASSESSMENT — PAIN DESCRIPTION - PROGRESSION: CLINICAL_PROGRESSION: GRADUALLY WORSENING

## 2022-01-16 ASSESSMENT — PAIN DESCRIPTION - DESCRIPTORS: DESCRIPTORS: ACHING;THROBBING

## 2022-01-16 ASSESSMENT — PAIN SCALES - GENERAL: PAINLEVEL_OUTOF10: 8

## 2022-01-16 NOTE — Clinical Note
Presley Bence was seen and treated in our emergency department on 1/16/2022. She may return to work on 01/17/2022. If you have any questions or concerns, please don't hesitate to call.       Sonia Farrell, DO

## 2022-01-16 NOTE — ED PROVIDER NOTES
The history is provided by the patient. Ear Problem  Location:  Left  Behind ear:  No abnormality  Quality:  Pressure  Severity:  Mild  Onset quality:  Gradual  Timing:  Constant  Progression:  Worsening  Chronicity:  New  Relieved by:  Nothing  Worsened by:  Nothing  Ineffective treatments:  None tried  Associated symptoms: hearing loss    Associated symptoms: no cough, no diarrhea, no fever, no headaches, no rash, no sore throat and no vomiting         Review of Systems   Constitutional: Negative for chills and fever. HENT: Positive for ear pain and hearing loss. Negative for sinus pressure and sore throat. Eyes: Negative for pain, discharge and redness. Respiratory: Negative for cough, shortness of breath and wheezing. Cardiovascular: Negative for chest pain. Gastrointestinal: Negative for abdominal distention, diarrhea, nausea and vomiting. Genitourinary: Negative for dysuria and frequency. Musculoskeletal: Negative for arthralgias and back pain. Skin: Negative for rash and wound. Neurological: Negative for weakness and headaches. Hematological: Negative for adenopathy. Psychiatric/Behavioral: Negative. All other systems reviewed and are negative. Physical Exam  Vitals and nursing note reviewed. Constitutional:       Appearance: She is well-developed. HENT:      Head: Normocephalic and atraumatic. Right Ear: Tympanic membrane normal.      Left Ear: There is impacted cerumen. Eyes:      Pupils: Pupils are equal, round, and reactive to light. Cardiovascular:      Rate and Rhythm: Normal rate and regular rhythm. Heart sounds: Normal heart sounds. No murmur heard. Pulmonary:      Effort: Pulmonary effort is normal.      Breath sounds: Normal breath sounds. Abdominal:      General: Bowel sounds are normal.      Palpations: Abdomen is soft. Tenderness: There is no abdominal tenderness. There is no guarding or rebound.    Musculoskeletal:      Cervical back: Normal range of motion and neck supple. Skin:     General: Skin is warm and dry. Neurological:      Mental Status: She is alert and oriented to person, place, and time. Psychiatric:         Behavior: Behavior normal.         Thought Content: Thought content normal.         Judgment: Judgment normal.      .erpro  --------------------------------------------- PAST HISTORY ---------------------------------------------  Past Medical History:  has a past medical history of ADHD, Bipolar 1 disorder ([address] Depression, and Insomnia. Past Surgical History:  has no past surgical history on file. Social History:  reports that she has been smoking cigarettes. She has been smoking about 0.50 packs per day. She has never used smokeless tobacco. She reports current drug use. Drug: Marijuana Fredick Copping). She reports that she does not drink alcohol. Family History: family history is not on file. The patients home medications have been reviewed. Allergies: Sulfa antibiotics, Amoxicillin, and Nickel    -------------------------------------------------- RESULTS -------------------------------------------------  No results found for this visit on 01/16/22. No orders to display       ------------------------- NURSING NOTES AND VITALS REVIEWED ---------------------------   The nursing notes within the ED encounter and vital signs as below have been reviewed. /86   Pulse 106   Temp 97 °F (36.1 °C) (Temporal)   Resp 16   Ht 5' 4\" (1.626 m)   Wt 230 lb (104.3 kg)   LMP 01/14/2022   SpO2 100%   BMI 39.48 kg/m²   Oxygen Saturation Interpretation: Normal      ------------------------------------------ PROGRESS NOTES ------------------------------------------   I have spoken with the patient and discussed todays results, in addition to providing specific details for the plan of care and counseling regarding the diagnosis and prognosis.   Their questions are answered at this time and they are agreeable with the plan.      --------------------------------- ADDITIONAL PROVIDER NOTES ---------------------------------        This patient is stable for discharge. I have shared the specific conditions for return, as well as the importance of follow-up. IMPRESSION:     1.  Impacted cerumen of left ear      Discharge Medication List as of 1/16/2022  9:17 AM      START taking these medications    Details   neomycin-polymyxin-hydrocortisone 1 % SOLN otic solution Place 2 drops into the left ear every 8 hours for 5 days, Disp-10 mL, R-0Normal         STOP taking these medications       brompheniramine-pseudoephedrine-DM 2-30-10 MG/5ML syrup Comments:   Reason for Stopping:               Procedures     MDM       L EAC H2O irrigation  With full flush of impacted cerumen, L EAC markedly hyperemic, L TM visualized and intact post irrigation; pt tolerated procedure well    ]       Denise Roberts DO  01/16/22 0633

## 2022-07-06 ENCOUNTER — HOSPITAL ENCOUNTER (EMERGENCY)
Age: 22
Discharge: HOME | End: 2022-07-06
Attending: EMERGENCY MEDICINE
Payer: COMMERCIAL

## 2022-07-06 VITALS
BODY MASS INDEX: 40.12 KG/M2 | DIASTOLIC BLOOD PRESSURE: 90 MMHG | SYSTOLIC BLOOD PRESSURE: 130 MMHG | WEIGHT: 235 LBS | RESPIRATION RATE: 16 BRPM | TEMPERATURE: 99.3 F | OXYGEN SATURATION: 99 % | HEART RATE: 92 BPM | HEIGHT: 64 IN

## 2022-07-06 DIAGNOSIS — B34.9 VIRAL SYNDROME: Primary | ICD-10-CM

## 2022-07-06 LAB — SARS-COV-2, NAAT: NOT DETECTED

## 2022-07-06 PROCEDURE — 87635 SARS-COV-2 COVID-19 AMP PRB: CPT

## 2022-07-06 PROCEDURE — 99283 EMERGENCY DEPT VISIT LOW MDM: CPT

## 2022-07-06 RX ORDER — BROMPHENIRAMINE MALEATE, PSEUDOEPHEDRINE HYDROCHLORIDE, AND DEXTROMETHORPHAN HYDROBROMIDE 2; 30; 10 MG/5ML; MG/5ML; MG/5ML
5 SYRUP ORAL 4 TIMES DAILY PRN
Qty: 120 ML | Refills: 0 | Status: SHIPPED | OUTPATIENT
Start: 2022-07-06

## 2022-07-06 RX ORDER — ONDANSETRON 4 MG/1
4 TABLET, ORALLY DISINTEGRATING ORAL EVERY 8 HOURS PRN
Qty: 10 TABLET | Refills: 0 | Status: SHIPPED | OUTPATIENT
Start: 2022-07-06 | End: 2023-07-06

## 2022-07-06 ASSESSMENT — ENCOUNTER SYMPTOMS
SINUS PRESSURE: 0
BACK PAIN: 0
EYE DISCHARGE: 0
WHEEZING: 0
BLOOD IN STOOL: 0
ABDOMINAL PAIN: 0
ABDOMINAL DISTENTION: 0
EYE PAIN: 0
DIARRHEA: 1
SHORTNESS OF BREATH: 0
COUGH: 0
SORE THROAT: 0
VOMITING: 1
EYE REDNESS: 0
NAUSEA: 1

## 2022-07-06 ASSESSMENT — PAIN - FUNCTIONAL ASSESSMENT: PAIN_FUNCTIONAL_ASSESSMENT: NONE - DENIES PAIN

## 2022-07-06 NOTE — ED PROVIDER NOTES
The history is provided by the patient. Illness   The current episode started today. The onset was sudden. The problem is moderate. Associated symptoms include a fever, diarrhea, nausea, vomiting and muscle aches. Pertinent negatives include no abdominal pain, no ear pain, no headaches, no sore throat, no cough, no wheezing, no rash, no eye discharge, no eye pain and no eye redness. Review of Systems   Constitutional: Positive for fever. Negative for chills. HENT: Negative for ear pain, sinus pressure and sore throat. Eyes: Negative for pain, discharge and redness. Respiratory: Negative for cough, shortness of breath and wheezing. Cardiovascular: Negative for chest pain. Gastrointestinal: Positive for diarrhea, nausea and vomiting. Negative for abdominal distention, abdominal pain and blood in stool. Genitourinary: Negative for dysuria and frequency. Musculoskeletal: Negative for arthralgias and back pain. Skin: Negative for rash and wound. Neurological: Negative for weakness and headaches. Hematological: Negative for adenopathy. All other systems reviewed and are negative. Physical Exam  Vitals and nursing note reviewed. Constitutional:       Appearance: She is well-developed. HENT:      Head: Normocephalic and atraumatic. Right Ear: Hearing and external ear normal. Tympanic membrane is retracted. Left Ear: Hearing and external ear normal. Tympanic membrane is retracted. Nose: Mucosal edema and congestion present. Mouth/Throat:      Pharynx: Uvula midline. Eyes:      General: Lids are normal.      Conjunctiva/sclera: Conjunctivae normal.      Pupils: Pupils are equal, round, and reactive to light. Cardiovascular:      Rate and Rhythm: Normal rate and regular rhythm. Heart sounds: Normal heart sounds. No murmur heard. Pulmonary:      Effort: Pulmonary effort is normal. No respiratory distress. Breath sounds: Normal breath sounds.  No wheezing or rales. Abdominal:      General: Bowel sounds are normal.      Palpations: Abdomen is soft. Abdomen is not rigid. Tenderness: There is no abdominal tenderness. There is no guarding or rebound. Musculoskeletal:      Cervical back: Normal range of motion and neck supple. Skin:     General: Skin is warm and dry. Findings: No abrasion or rash. Neurological:      Mental Status: She is alert and oriented to person, place, and time. GCS: GCS eye subscore is 4. GCS verbal subscore is 5. GCS motor subscore is 6. Cranial Nerves: No cranial nerve deficit. Sensory: No sensory deficit. Coordination: Coordination normal.      Gait: Gait normal.          Procedures     MDM        --------------------------------------------- PAST HISTORY ---------------------------------------------  Past Medical History:  has a past medical history of ADHD, Bipolar 1 disorder ([facility] [address] Depression, and Insomnia. Past Surgical History:  has no past surgical history on file. Social History:  reports that she has been smoking cigarettes. She has been smoking about 0.50 packs per day. She has never used smokeless tobacco. She reports current drug use. Drug: Marijuana Nael Mustard). She reports that she does not drink alcohol. Family History: family history is not on file. The patients home medications have been reviewed.     Allergies: Sulfa antibiotics, Amoxicillin, and Nickel    -------------------------------------------------- RESULTS -------------------------------------------------  Labs:  Results for orders placed or performed during the hospital encounter of 07/06/22   COVID-19, Rapid    Specimen: Nasopharyngeal Swab   Result Value Ref Range    SARS-CoV-2, NAAT Not Detected Not Detected       Radiology:  No orders to display       ------------------------- NURSING NOTES AND VITALS REVIEWED ---------------------------  Date / Time Roomed:  7/6/2022 11:57 AM  ED Bed Assignment:  01/01    The

## 2022-07-06 NOTE — Clinical Note
Robb Jiménez was seen and treated in our emergency department on 7/6/2022. She may return to work on 07/07/2022. If you have any questions or concerns, please don't hesitate to call.       Kaleta Dakin, MD

## 2023-03-11 ENCOUNTER — HOSPITAL ENCOUNTER (EMERGENCY)
Age: 23
Discharge: HOME | End: 2023-03-11
Attending: STUDENT IN AN ORGANIZED HEALTH CARE EDUCATION/TRAINING PROGRAM
Payer: COMMERCIAL

## 2023-03-11 VITALS
RESPIRATION RATE: 16 BRPM | OXYGEN SATURATION: 97 % | SYSTOLIC BLOOD PRESSURE: 156 MMHG | BODY MASS INDEX: 39.27 KG/M2 | HEART RATE: 65 BPM | HEIGHT: 64 IN | WEIGHT: 230 LBS | DIASTOLIC BLOOD PRESSURE: 82 MMHG | TEMPERATURE: 97.6 F

## 2023-03-11 DIAGNOSIS — J06.9 VIRAL URI: Primary | ICD-10-CM

## 2023-03-11 PROCEDURE — 99283 EMERGENCY DEPT VISIT LOW MDM: CPT

## 2023-03-11 RX ORDER — IBUPROFEN 800 MG/1
800 TABLET ORAL EVERY 8 HOURS PRN
Qty: 30 TABLET | Refills: 0 | Status: SHIPPED | OUTPATIENT
Start: 2023-03-11

## 2023-03-11 RX ORDER — ONDANSETRON 4 MG/1
4 TABLET, ORALLY DISINTEGRATING ORAL 3 TIMES DAILY PRN
Qty: 10 TABLET | Refills: 0 | Status: SHIPPED | OUTPATIENT
Start: 2023-03-11

## 2023-03-11 ASSESSMENT — ENCOUNTER SYMPTOMS
SHORTNESS OF BREATH: 0
COLOR CHANGE: 0
NAUSEA: 0
BACK PAIN: 0
DIARRHEA: 0
VOMITING: 0
ABDOMINAL PAIN: 0
COUGH: 1

## 2023-03-11 NOTE — ED PROVIDER NOTES
HPI   59-year-old female patient present to emergency department for evaluation of cough, fatigue, congestion. Symptoms ongoing for the last 4 days. Patient states that she has been having associated body aches. Yesterday and today she had 1 episode of vomiting. Nonbloody. She denies any diarrhea, no abdominal pain no chest pain or shortness of breath. No leg swelling no calf pain. Patient is requesting work excuse. She denies any chance of pregnancy, she has IUD in place. Review of Systems   Constitutional:  Positive for fatigue. Negative for chills and fever. HENT:  Positive for congestion. Respiratory:  Positive for cough. Negative for shortness of breath. Cardiovascular:  Negative for chest pain. Gastrointestinal:  Negative for abdominal pain, diarrhea, nausea and vomiting. Genitourinary:  Negative for difficulty urinating, dysuria and hematuria. Musculoskeletal:  Positive for myalgias. Negative for back pain. Skin:  Negative for color change. All other systems reviewed and are negative. Physical Exam  Vitals and nursing note reviewed. Constitutional:       Appearance: Normal appearance. HENT:      Head: Normocephalic and atraumatic. Nose: Congestion present. Mouth/Throat:      Mouth: Mucous membranes are moist.      Pharynx: Oropharynx is clear. No oropharyngeal exudate or posterior oropharyngeal erythema. Eyes:      Conjunctiva/sclera: Conjunctivae normal.      Pupils: Pupils are equal, round, and reactive to light. Cardiovascular:      Rate and Rhythm: Normal rate and regular rhythm. Pulses: Normal pulses. Heart sounds: Normal heart sounds. Pulmonary:      Effort: Pulmonary effort is normal. No respiratory distress. Breath sounds: Normal breath sounds. Abdominal:      General: Bowel sounds are normal. There is no distension. Tenderness: There is no abdominal tenderness. Musculoskeletal:         General: Normal range of motion. Cervical back: Normal range of motion and neck supple. Skin:     General: Skin is warm and dry. Capillary Refill: Capillary refill takes less than 2 seconds. Neurological:      General: No focal deficit present. Mental Status: She is alert. Procedures     MDM  Patient is well-appearing in no acute distress. Her vital signs are stable she is afebrile. She is eating and drinking well. Symptoms seem to be consistent with viral illness. She denies chance of pregnancy. She has IUD in place. At this time we did not obtain pregnancy test or urinalysis. Patient is having upper respiratory symptoms. She denies any urinary symptoms. No vaginal bleeding or abnormal discharge. We will treat symptomatically with ibuprofen and Zofran. She is not a diabetic. She is to follow-up with PCP if symptoms are persisting return precautions were discussed. Work note was given as patient requested. --------------------------------------------- PAST HISTORY ---------------------------------------------  Past Medical History:  has a past medical history of ADHD, Bipolar 1 disorder ([facility] [address] Depression, and Insomnia. Past Surgical History:  has no past surgical history on file. Social History:  reports that she has quit smoking. Her smoking use included cigarettes. She smoked an average of .5 packs per day. She has never used smokeless tobacco. She reports current drug use. Drug: Marijuana [place]). She reports that she does not drink alcohol. Family History: family history is not on file. The patients home medications have been reviewed. Allergies: Sulfa antibiotics, Amoxicillin, and Nickel    -------------------------------------------------- RESULTS -------------------------------------------------  Labs:  No results found for this visit on 03/11/23.     Radiology:  No orders to display       ------------------------- NURSING NOTES AND VITALS REVIEWED ---------------------------  Date / Time Roomed:  3/11/2023  3:53 PM  ED Bed Assignment:  02/02    The nursing notes within the ED encounter and vital signs as below have been reviewed. BP (!) 156/82   Pulse 65   Temp 97.6 °F (36.4 °C) (Temporal)   Resp 16   Ht 5' 4\" (1.626 m)   Wt 230 lb (104.3 kg)   SpO2 97%   BMI 39.48 kg/m²   Oxygen Saturation Interpretation: Normal    --------------------------------- ADDITIONAL PROVIDER NOTES ---------------------------------  At this time the patient is without objective evidence of an acute process requiring hospitalization or inpatient management. They have remained hemodynamically stable throughout their entire ED visit and are stable for discharge with outpatient follow-up. The plan has been discussed in detail and they are aware of the specific conditions for emergent return, as well as the importance of follow-up. New Prescriptions    IBUPROFEN (ADVIL;MOTRIN) 800 MG TABLET    Take 1 tablet by mouth every 8 hours as needed for Pain    ONDANSETRON (ZOFRAN-ODT) 4 MG DISINTEGRATING TABLET    Take 1 tablet by mouth 3 times daily as needed for Nausea or Vomiting       Diagnosis:  1. Viral URI        Disposition:  Patient's disposition: Discharge to home  Patient's condition is stable.        Keerthi Taveras DO  Resident  03/11/23 [id number]

## 2023-03-11 NOTE — Clinical Note
Nena Frye was seen and treated in our emergency department on 3/11/2023. She may return to work on 03/13/2023. If you have any questions or concerns, please don't hesitate to call.       Patrick Cruz, DO

## 2023-05-25 ENCOUNTER — OFFICE VISIT (OUTPATIENT)
Dept: ORTHOPEDIC SURGERY | Age: 23
End: 2023-05-25

## 2023-05-25 VITALS — HEIGHT: 64 IN | WEIGHT: 220 LBS | TEMPERATURE: 98 F | BODY MASS INDEX: 37.56 KG/M2

## 2023-05-25 DIAGNOSIS — M25.332 SCAPHOLUNATE INSTABILITY OF LEFT WRIST: ICD-10-CM

## 2023-05-25 DIAGNOSIS — M65.4 DE QUERVAIN'S TENOSYNOVITIS: Primary | ICD-10-CM

## 2023-05-25 DIAGNOSIS — M25.532 LEFT WRIST PAIN: Primary | ICD-10-CM

## 2023-06-01 ENCOUNTER — TELEPHONE (OUTPATIENT)
Dept: ORTHOPEDIC SURGERY | Age: 23
End: 2023-06-01

## 2023-06-05 ENCOUNTER — TELEPHONE (OUTPATIENT)
Dept: MRI IMAGING | Age: 23
End: 2023-06-05

## 2023-06-05 NOTE — TELEPHONE ENCOUNTER
Please close office note so that prior authorization can be started. This is a caresource and she is scheduled for June 8, 2023. Thank you.

## 2023-06-07 ENCOUNTER — TELEPHONE (OUTPATIENT)
Dept: ORTHOPEDIC SURGERY | Age: 23
End: 2023-06-07

## 2023-06-26 DIAGNOSIS — M25.332 SCAPHOLUNATE INSTABILITY OF LEFT WRIST: Primary | ICD-10-CM

## 2023-09-27 ENCOUNTER — HOSPITAL ENCOUNTER (EMERGENCY)
Age: 23
Discharge: HOME | End: 2023-09-27
Attending: EMERGENCY MEDICINE
Payer: COMMERCIAL

## 2023-09-27 VITALS
DIASTOLIC BLOOD PRESSURE: 100 MMHG | WEIGHT: 200 LBS | BODY MASS INDEX: 34.15 KG/M2 | TEMPERATURE: 97.9 F | HEART RATE: 115 BPM | SYSTOLIC BLOOD PRESSURE: 148 MMHG | OXYGEN SATURATION: 98 % | RESPIRATION RATE: 16 BRPM | HEIGHT: 64 IN

## 2023-09-27 DIAGNOSIS — U07.1 COVID-19: Primary | ICD-10-CM

## 2023-09-27 LAB
SARS-COV-2 RDRP RESP QL NAA+PROBE: DETECTED
SPECIMEN DESCRIPTION: ABNORMAL
SPECIMEN SOURCE: NORMAL
STREP A, MOLECULAR: NEGATIVE

## 2023-09-27 PROCEDURE — 99283 EMERGENCY DEPT VISIT LOW MDM: CPT

## 2023-09-27 PROCEDURE — 87635 SARS-COV-2 COVID-19 AMP PRB: CPT

## 2023-09-27 RX ORDER — BROMPHENIRAMINE MALEATE, PSEUDOEPHEDRINE HYDROCHLORIDE, AND DEXTROMETHORPHAN HYDROBROMIDE 2; 30; 10 MG/5ML; MG/5ML; MG/5ML
5 SYRUP ORAL 4 TIMES DAILY PRN
Qty: 120 ML | Refills: 0 | Status: SHIPPED | OUTPATIENT
Start: 2023-09-27

## 2023-09-27 ASSESSMENT — ENCOUNTER SYMPTOMS
SHORTNESS OF BREATH: 0
COUGH: 1
NAUSEA: 0
RHINORRHEA: 1
VOMITING: 0
SORE THROAT: 1
ABDOMINAL PAIN: 0
BLOOD IN STOOL: 0
BACK PAIN: 0

## 2023-11-26 ENCOUNTER — HOSPITAL ENCOUNTER (EMERGENCY)
Age: 23
Discharge: HOME | End: 2023-11-26
Attending: EMERGENCY MEDICINE

## 2023-11-26 VITALS
WEIGHT: 213 LBS | BODY MASS INDEX: 36.56 KG/M2 | DIASTOLIC BLOOD PRESSURE: 75 MMHG | OXYGEN SATURATION: 100 % | RESPIRATION RATE: 16 BRPM | SYSTOLIC BLOOD PRESSURE: 120 MMHG | HEART RATE: 88 BPM | TEMPERATURE: 98.1 F

## 2023-11-26 DIAGNOSIS — N61.0 MASTITIS, RIGHT, ACUTE: Primary | ICD-10-CM

## 2023-11-26 PROCEDURE — 99283 EMERGENCY DEPT VISIT LOW MDM: CPT

## 2023-11-26 RX ORDER — IBUPROFEN 600 MG/1
600 TABLET ORAL EVERY 8 HOURS PRN
Qty: 30 TABLET | Refills: 0 | Status: SHIPPED | OUTPATIENT
Start: 2023-11-26 | End: 2023-12-06

## 2023-11-26 RX ORDER — CLINDAMYCIN HYDROCHLORIDE 150 MG/1
150 CAPSULE ORAL 4 TIMES DAILY
Qty: 40 CAPSULE | Refills: 0 | Status: SHIPPED | OUTPATIENT
Start: 2023-11-26 | End: 2023-12-06

## 2023-11-26 ASSESSMENT — PAIN DESCRIPTION - LOCATION: LOCATION: BREAST

## 2023-11-26 ASSESSMENT — PAIN - FUNCTIONAL ASSESSMENT
PAIN_FUNCTIONAL_ASSESSMENT: 0-10
PAIN_FUNCTIONAL_ASSESSMENT: 0-10

## 2023-11-26 ASSESSMENT — PAIN DESCRIPTION - DESCRIPTORS: DESCRIPTORS: SORE

## 2023-11-26 ASSESSMENT — PAIN DESCRIPTION - ORIENTATION: ORIENTATION: RIGHT

## 2023-11-26 ASSESSMENT — PAIN DESCRIPTION - ONSET: ONSET: GRADUAL

## 2023-11-26 ASSESSMENT — PAIN SCALES - GENERAL
PAINLEVEL_OUTOF10: 0
PAINLEVEL_OUTOF10: 0

## 2023-11-26 ASSESSMENT — PAIN DESCRIPTION - PAIN TYPE: TYPE: ACUTE PAIN

## 2023-11-26 NOTE — ED NOTES
No open wound noted to right breast area. Pt accepted Telfa pad to apply over the nipple/redness area to ease discomfort from touching to bra.      Joelle Sellers RN  11/26/23 [id number]